# Patient Record
Sex: MALE | Race: WHITE | NOT HISPANIC OR LATINO | Employment: UNEMPLOYED | URBAN - NONMETROPOLITAN AREA
[De-identification: names, ages, dates, MRNs, and addresses within clinical notes are randomized per-mention and may not be internally consistent; named-entity substitution may affect disease eponyms.]

---

## 2021-11-19 ENCOUNTER — HOSPITAL ENCOUNTER (EMERGENCY)
Facility: HOSPITAL | Age: 46
Discharge: PSYCHIATRIC OR SUBSTANCE USE FACILITY | End: 2021-11-19
Attending: STUDENT IN AN ORGANIZED HEALTH CARE EDUCATION/TRAINING PROGRAM | Admitting: STUDENT IN AN ORGANIZED HEALTH CARE EDUCATION/TRAINING PROGRAM

## 2021-11-19 ENCOUNTER — HOSPITAL ENCOUNTER (INPATIENT)
Facility: HOSPITAL | Age: 46
LOS: 4 days | Discharge: LEFT BEFORE DISCHARGE | End: 2021-11-23
Attending: PSYCHIATRY & NEUROLOGY | Admitting: PSYCHIATRY & NEUROLOGY

## 2021-11-19 VITALS
TEMPERATURE: 97.9 F | WEIGHT: 190 LBS | OXYGEN SATURATION: 99 % | BODY MASS INDEX: 26.6 KG/M2 | SYSTOLIC BLOOD PRESSURE: 151 MMHG | HEIGHT: 71 IN | RESPIRATION RATE: 18 BRPM | DIASTOLIC BLOOD PRESSURE: 95 MMHG | HEART RATE: 67 BPM

## 2021-11-19 DIAGNOSIS — F19.10 SUBSTANCE ABUSE (HCC): Primary | ICD-10-CM

## 2021-11-19 DIAGNOSIS — R44.3 HALLUCINATIONS: ICD-10-CM

## 2021-11-19 PROBLEM — F19.20 CHEMICAL DEPENDENCY (HCC): Status: ACTIVE | Noted: 2021-11-19

## 2021-11-19 LAB
ALBUMIN SERPL-MCNC: 4.23 G/DL (ref 3.5–5.2)
ALBUMIN/GLOB SERPL: 1.2 G/DL
ALP SERPL-CCNC: 119 U/L (ref 39–117)
ALT SERPL W P-5'-P-CCNC: 124 U/L (ref 1–41)
AMPHET+METHAMPHET UR QL: POSITIVE
AMPHETAMINES UR QL: POSITIVE
ANION GAP SERPL CALCULATED.3IONS-SCNC: 11.8 MMOL/L (ref 5–15)
AST SERPL-CCNC: 119 U/L (ref 1–40)
BARBITURATES UR QL SCN: NEGATIVE
BASOPHILS # BLD AUTO: 0.03 10*3/MM3 (ref 0–0.2)
BASOPHILS NFR BLD AUTO: 0.4 % (ref 0–1.5)
BENZODIAZ UR QL SCN: NEGATIVE
BILIRUB SERPL-MCNC: 1.7 MG/DL (ref 0–1.2)
BILIRUB UR QL STRIP: NEGATIVE
BUN SERPL-MCNC: 9 MG/DL (ref 6–20)
BUN/CREAT SERPL: 11.3 (ref 7–25)
BUPRENORPHINE SERPL-MCNC: NEGATIVE NG/ML
CALCIUM SPEC-SCNC: 8.9 MG/DL (ref 8.6–10.5)
CANNABINOIDS SERPL QL: POSITIVE
CHLORIDE SERPL-SCNC: 102 MMOL/L (ref 98–107)
CLARITY UR: CLEAR
CO2 SERPL-SCNC: 24.2 MMOL/L (ref 22–29)
COCAINE UR QL: NEGATIVE
COLOR UR: YELLOW
CREAT SERPL-MCNC: 0.8 MG/DL (ref 0.76–1.27)
DEPRECATED RDW RBC AUTO: 50.8 FL (ref 37–54)
EOSINOPHIL # BLD AUTO: 0.14 10*3/MM3 (ref 0–0.4)
EOSINOPHIL NFR BLD AUTO: 1.9 % (ref 0.3–6.2)
ERYTHROCYTE [DISTWIDTH] IN BLOOD BY AUTOMATED COUNT: 13.3 % (ref 12.3–15.4)
ETHANOL BLD-MCNC: <10 MG/DL (ref 0–10)
ETHANOL UR QL: <0.01 %
FLUAV SUBTYP SPEC NAA+PROBE: NOT DETECTED
FLUBV RNA ISLT QL NAA+PROBE: NOT DETECTED
GFR SERPL CREATININE-BSD FRML MDRD: 105 ML/MIN/1.73
GLOBULIN UR ELPH-MCNC: 3.5 GM/DL
GLUCOSE SERPL-MCNC: 99 MG/DL (ref 65–99)
GLUCOSE UR STRIP-MCNC: NEGATIVE MG/DL
HCT VFR BLD AUTO: 43.9 % (ref 37.5–51)
HGB BLD-MCNC: 15.1 G/DL (ref 13–17.7)
HGB UR QL STRIP.AUTO: NEGATIVE
IMM GRANULOCYTES # BLD AUTO: 0.02 10*3/MM3 (ref 0–0.05)
IMM GRANULOCYTES NFR BLD AUTO: 0.3 % (ref 0–0.5)
KETONES UR QL STRIP: NEGATIVE
LEUKOCYTE ESTERASE UR QL STRIP.AUTO: NEGATIVE
LYMPHOCYTES # BLD AUTO: 1.49 10*3/MM3 (ref 0.7–3.1)
LYMPHOCYTES NFR BLD AUTO: 19.7 % (ref 19.6–45.3)
MAGNESIUM SERPL-MCNC: 1.7 MG/DL (ref 1.6–2.6)
MCH RBC QN AUTO: 35 PG (ref 26.6–33)
MCHC RBC AUTO-ENTMCNC: 34.4 G/DL (ref 31.5–35.7)
MCV RBC AUTO: 101.6 FL (ref 79–97)
METHADONE UR QL SCN: NEGATIVE
MONOCYTES # BLD AUTO: 0.71 10*3/MM3 (ref 0.1–0.9)
MONOCYTES NFR BLD AUTO: 9.4 % (ref 5–12)
NEUTROPHILS NFR BLD AUTO: 5.17 10*3/MM3 (ref 1.7–7)
NEUTROPHILS NFR BLD AUTO: 68.3 % (ref 42.7–76)
NITRITE UR QL STRIP: NEGATIVE
NRBC BLD AUTO-RTO: 0 /100 WBC (ref 0–0.2)
OPIATES UR QL: NEGATIVE
OXYCODONE UR QL SCN: NEGATIVE
PCP UR QL SCN: NEGATIVE
PH UR STRIP.AUTO: 6.5 [PH] (ref 5–8)
PLATELET # BLD AUTO: 80 10*3/MM3 (ref 140–450)
PMV BLD AUTO: 10.7 FL (ref 6–12)
POTASSIUM SERPL-SCNC: 3.2 MMOL/L (ref 3.5–5.2)
PROPOXYPH UR QL: NEGATIVE
PROT SERPL-MCNC: 7.7 G/DL (ref 6–8.5)
PROT UR QL STRIP: NEGATIVE
RBC # BLD AUTO: 4.32 10*6/MM3 (ref 4.14–5.8)
SARS-COV-2 RNA PNL SPEC NAA+PROBE: NOT DETECTED
SODIUM SERPL-SCNC: 138 MMOL/L (ref 136–145)
SP GR UR STRIP: 1.02 (ref 1–1.03)
TRICYCLICS UR QL SCN: NEGATIVE
UROBILINOGEN UR QL STRIP: NORMAL
WBC NRBC COR # BLD: 7.56 10*3/MM3 (ref 3.4–10.8)

## 2021-11-19 PROCEDURE — 85025 COMPLETE CBC W/AUTO DIFF WBC: CPT | Performed by: STUDENT IN AN ORGANIZED HEALTH CARE EDUCATION/TRAINING PROGRAM

## 2021-11-19 PROCEDURE — 83735 ASSAY OF MAGNESIUM: CPT | Performed by: STUDENT IN AN ORGANIZED HEALTH CARE EDUCATION/TRAINING PROGRAM

## 2021-11-19 PROCEDURE — 81003 URINALYSIS AUTO W/O SCOPE: CPT | Performed by: STUDENT IN AN ORGANIZED HEALTH CARE EDUCATION/TRAINING PROGRAM

## 2021-11-19 PROCEDURE — 80053 COMPREHEN METABOLIC PANEL: CPT | Performed by: STUDENT IN AN ORGANIZED HEALTH CARE EDUCATION/TRAINING PROGRAM

## 2021-11-19 PROCEDURE — 99284 EMERGENCY DEPT VISIT MOD MDM: CPT

## 2021-11-19 PROCEDURE — 93005 ELECTROCARDIOGRAM TRACING: CPT | Performed by: PSYCHIATRY & NEUROLOGY

## 2021-11-19 PROCEDURE — 80306 DRUG TEST PRSMV INSTRMNT: CPT | Performed by: STUDENT IN AN ORGANIZED HEALTH CARE EDUCATION/TRAINING PROGRAM

## 2021-11-19 PROCEDURE — 82077 ASSAY SPEC XCP UR&BREATH IA: CPT | Performed by: STUDENT IN AN ORGANIZED HEALTH CARE EDUCATION/TRAINING PROGRAM

## 2021-11-19 PROCEDURE — 87636 SARSCOV2 & INF A&B AMP PRB: CPT | Performed by: STUDENT IN AN ORGANIZED HEALTH CARE EDUCATION/TRAINING PROGRAM

## 2021-11-19 RX ORDER — LORAZEPAM 2 MG/1
2 TABLET ORAL
Status: DISPENSED | OUTPATIENT
Start: 2021-11-19 | End: 2021-11-20

## 2021-11-19 RX ORDER — LORAZEPAM 1 MG/1
1 TABLET ORAL EVERY 4 HOURS PRN
Status: DISPENSED | OUTPATIENT
Start: 2021-11-22 | End: 2021-11-23

## 2021-11-19 RX ORDER — FAMOTIDINE 20 MG/1
20 TABLET, FILM COATED ORAL 2 TIMES DAILY PRN
Status: DISCONTINUED | OUTPATIENT
Start: 2021-11-19 | End: 2021-11-23 | Stop reason: HOSPADM

## 2021-11-19 RX ORDER — HYDROXYZINE 50 MG/1
50 TABLET, FILM COATED ORAL EVERY 6 HOURS PRN
Status: DISCONTINUED | OUTPATIENT
Start: 2021-11-19 | End: 2021-11-23 | Stop reason: HOSPADM

## 2021-11-19 RX ORDER — LOPERAMIDE HYDROCHLORIDE 2 MG/1
2 CAPSULE ORAL
Status: DISCONTINUED | OUTPATIENT
Start: 2021-11-19 | End: 2021-11-23 | Stop reason: HOSPADM

## 2021-11-19 RX ORDER — ALUMINA, MAGNESIA, AND SIMETHICONE 2400; 2400; 240 MG/30ML; MG/30ML; MG/30ML
15 SUSPENSION ORAL EVERY 6 HOURS PRN
Status: DISCONTINUED | OUTPATIENT
Start: 2021-11-19 | End: 2021-11-23 | Stop reason: HOSPADM

## 2021-11-19 RX ORDER — IBUPROFEN 400 MG/1
400 TABLET ORAL EVERY 6 HOURS PRN
Status: DISCONTINUED | OUTPATIENT
Start: 2021-11-19 | End: 2021-11-23 | Stop reason: HOSPADM

## 2021-11-19 RX ORDER — TRAZODONE HYDROCHLORIDE 50 MG/1
50 TABLET ORAL NIGHTLY PRN
Status: DISCONTINUED | OUTPATIENT
Start: 2021-11-19 | End: 2021-11-22

## 2021-11-19 RX ORDER — LORAZEPAM 0.5 MG/1
0.5 TABLET ORAL EVERY 4 HOURS PRN
Status: DISCONTINUED | OUTPATIENT
Start: 2021-11-23 | End: 2021-11-23 | Stop reason: HOSPADM

## 2021-11-19 RX ORDER — LORAZEPAM 2 MG/1
2 TABLET ORAL
Status: DISPENSED | OUTPATIENT
Start: 2021-11-20 | End: 2021-11-21

## 2021-11-19 RX ORDER — LORAZEPAM 2 MG/1
2 TABLET ORAL EVERY 4 HOURS PRN
Status: DISPENSED | OUTPATIENT
Start: 2021-11-20 | End: 2021-11-21

## 2021-11-19 RX ORDER — BENZTROPINE MESYLATE 1 MG/ML
1 INJECTION INTRAMUSCULAR; INTRAVENOUS ONCE AS NEEDED
Status: DISCONTINUED | OUTPATIENT
Start: 2021-11-19 | End: 2021-11-23 | Stop reason: HOSPADM

## 2021-11-19 RX ORDER — BENZTROPINE MESYLATE 1 MG/1
2 TABLET ORAL ONCE AS NEEDED
Status: DISCONTINUED | OUTPATIENT
Start: 2021-11-19 | End: 2021-11-23 | Stop reason: HOSPADM

## 2021-11-19 RX ORDER — ECHINACEA PURPUREA EXTRACT 125 MG
2 TABLET ORAL AS NEEDED
Status: DISCONTINUED | OUTPATIENT
Start: 2021-11-19 | End: 2021-11-23 | Stop reason: HOSPADM

## 2021-11-19 RX ORDER — LORAZEPAM 1 MG/1
1 TABLET ORAL
Status: COMPLETED | OUTPATIENT
Start: 2021-11-22 | End: 2021-11-22

## 2021-11-19 RX ORDER — ONDANSETRON 4 MG/1
4 TABLET, FILM COATED ORAL EVERY 6 HOURS PRN
Status: DISCONTINUED | OUTPATIENT
Start: 2021-11-19 | End: 2021-11-23 | Stop reason: HOSPADM

## 2021-11-19 RX ORDER — BENZONATATE 100 MG/1
100 CAPSULE ORAL 3 TIMES DAILY PRN
Status: DISCONTINUED | OUTPATIENT
Start: 2021-11-19 | End: 2021-11-23 | Stop reason: HOSPADM

## 2021-11-19 RX ORDER — LORAZEPAM 0.5 MG/1
0.5 TABLET ORAL
Status: DISCONTINUED | OUTPATIENT
Start: 2021-11-23 | End: 2021-11-23 | Stop reason: HOSPADM

## 2021-11-19 RX ORDER — NICOTINE 21 MG/24HR
1 PATCH, TRANSDERMAL 24 HOURS TRANSDERMAL
Status: DISCONTINUED | OUTPATIENT
Start: 2021-11-20 | End: 2021-11-23 | Stop reason: HOSPADM

## 2021-11-19 RX ADMIN — HYDROXYZINE HYDROCHLORIDE 50 MG: 50 TABLET ORAL at 22:40

## 2021-11-19 RX ADMIN — TRAZODONE HYDROCHLORIDE 50 MG: 50 TABLET ORAL at 22:39

## 2021-11-19 RX ADMIN — LORAZEPAM 2 MG: 2 TABLET ORAL at 22:40

## 2021-11-20 PROCEDURE — 99223 1ST HOSP IP/OBS HIGH 75: CPT | Performed by: PSYCHIATRY & NEUROLOGY

## 2021-11-20 RX ORDER — AMOXICILLIN AND CLAVULANATE POTASSIUM 875; 125 MG/1; MG/1
1 TABLET, FILM COATED ORAL EVERY 12 HOURS SCHEDULED
Status: DISCONTINUED | OUTPATIENT
Start: 2021-11-20 | End: 2021-11-23 | Stop reason: HOSPADM

## 2021-11-20 RX ADMIN — LORAZEPAM 2 MG: 2 TABLET ORAL at 04:13

## 2021-11-20 RX ADMIN — LORAZEPAM 2 MG: 2 TABLET ORAL at 12:10

## 2021-11-20 RX ADMIN — LORAZEPAM 2 MG: 2 TABLET ORAL at 09:04

## 2021-11-20 RX ADMIN — Medication 100 MG: at 09:04

## 2021-11-20 RX ADMIN — LORAZEPAM 2 MG: 2 TABLET ORAL at 17:35

## 2021-11-20 RX ADMIN — LORAZEPAM 2 MG: 2 TABLET ORAL at 06:38

## 2021-11-20 RX ADMIN — AMOXICILLIN AND CLAVULANATE POTASSIUM 1 TABLET: 875; 125 TABLET, FILM COATED ORAL at 13:47

## 2021-11-20 NOTE — NURSING NOTE
[facility] wishes to be contacted when pt is up for discharge. Pt was brought here to be cleared for admission to [facility].    [phone]

## 2021-11-20 NOTE — H&P
INITIAL PSYCHIATRIC HISTORY & PHYSICAL    Patient Identification:  Name:   Zach Jorgensen  Age:   46 y.o.  Sex:   male  :   1975  MRN:   [id number]  Visit Number:   [id number]  Primary Care Physician:   Princess Costello MD    SUBJECTIVE    CC/Focus of Exam: chemical dependency    HPI: Zach Jorgensen is a 46 y.o. male who was admitted on 2021 with complaints of drug use and withdrawals. The patient reports a long history of substance use. First use was 14 years old. Over time the use increased and the patient  continued to use despite negative consequences. The patient endorses symptoms of tolerance and withdrawals. Has tried to cut down and stop but has not been successful. Spends too much time and resources in pursuit of substance use. Longest period of sobriety is reported to be 6 months.  Currently using opiates, xanax, meth, marijuana, fifth of whiskey  Last use 2021  Withdrawal symptoms nausea, diarrhea, anxious, antzy, body aches  Patient states that he uses tobacco. Patient states that he has a history of seizures with withdrawal. Patient states that he relapsed because he just wanted to get high. Patient states keeping up with the upkeep of his house as a stressor in his life. Patient denies any history of physical,mental or sexual abuse. Patient rates his appetite as poor. Patient rates his sleep as poor. Patient states that he has nightmares. Patient rates his anxiety on a scale of 1/10 with 10 being the most severe a 10. Patient rates his depression on a scale of 1/10 with 10 being the most severe a 10. Patient rates his cravings on a scale of 1/10 with 10 being the most severe a 10. Patient's COWS was 3. Patient's CIWA was 9. Patient denies any suicidal ideation. Patient denies any homicidal ideation. Patient states that he has hallucinations. Patient states that he hears his sister's voice telling him to get help, he hears a voice that he doesn't recognize telling him to kill himself  and he see's shadows sometimes. Patient was admitted to [facility] psychiatry for further safety and stabilization.    Available medical/psychiatric records reviewed and incorporated into the current document.     PAST PSYCHIATRIC HX: Patient has had no prior admissions. Patient denies any outpatient care. Patient states that he plans on returning to [facility] once discharged.    SUBSTANCE USE HX: UDS was positive for Methamphetamine, Amphetamine, THC. See HPI for current use.    SOCIAL HX: Patient states that he was born and raised in [place], [place]. Patient states that he currently resides with his room mate in [place], [place]. Patient states that he is single and has 2 children that lives with their mother. 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 Patient states that he has a highschool diploma. Patient denies any legal issues.    Past Medical History:   Diagnosis Date   • Alcohol abuse    • Anxiety    • Depression    • Hepatic disease    • Hypertension    • Substance abuse (HCC)    • Withdrawal symptoms, alcohol (HCC)        Past Surgical History:   Procedure Laterality Date   • CHOLECYSTECTOMY     • HERNIA REPAIR     • INCISION AND DRAINAGE ABSCESS      Rt forearm       Family History   Problem Relation Age of Onset   • Hypertension Mother    • Hypertension Father    • Heart disease Father    • Diabetes Father    • Aneurysm Father    • Diabetes Maternal Grandfather    • Heart disease Paternal Grandfather          No medications prior to admission.           ALLERGIES:  Patient has no known allergies.    Temp:  [97.3 °F (36.3 °C)-97.9 °F (36.6 °C)] 97.7 °F (36.5 °C)  Heart Rate:  [64-78] 78  Resp:  [16-18] 18  BP: (133-176)/() 143/89    REVIEW OF SYSTEMS:  Review of Systems   Constitutional: Positive for activity change, chills, diaphoresis and fatigue.   HENT: Negative for sneezing and sore throat.    Eyes: Negative for discharge and  visual disturbance.   Respiratory: Negative for apnea and shortness of breath.    Cardiovascular: Negative for chest pain and palpitations.   Gastrointestinal: Positive for diarrhea and nausea.   Endocrine: Negative for cold intolerance and heat intolerance.   Genitourinary: Negative for dysuria and frequency.   Musculoskeletal: Positive for arthralgias and myalgias.   Skin: Negative for pallor and rash.   Allergic/Immunologic: Negative for environmental allergies and food allergies.   Neurological: Positive for tremors and weakness. Negative for dizziness.   Hematological: Negative for adenopathy. Does not bruise/bleed easily.   Psychiatric/Behavioral: Positive for dysphoric mood and sleep disturbance. The patient is nervous/anxious.       See HPI for psychiatric ROS  OBJECTIVE    PHYSICAL EXAM:  Physical Exam  Constitutional:       Appearance: Normal appearance. He is normal weight.   HENT:      Head: Normocephalic and atraumatic.      Right Ear: External ear normal.      Left Ear: External ear normal.      Nose: Nose normal.      Mouth/Throat:      Mouth: Mucous membranes are moist.      Pharynx: Oropharynx is clear.   Eyes:      Extraocular Movements: Extraocular movements intact.      Conjunctiva/sclera: Conjunctivae normal.      Pupils: Pupils are equal, round, and reactive to light.   Cardiovascular:      Rate and Rhythm: Normal rate and regular rhythm.      Pulses: Normal pulses.      Heart sounds: Normal heart sounds.   Pulmonary:      Effort: Pulmonary effort is normal.      Breath sounds: Normal breath sounds.   Abdominal:      General: Abdomen is flat. Bowel sounds are normal.      Palpations: Abdomen is soft.   Musculoskeletal:         General: Normal range of motion.      Cervical back: Normal range of motion and neck supple.   Skin:     General: Skin is warm and dry.   Neurological:      General: No focal deficit present.      Mental Status: He is alert and oriented to person, place, and time. Mental  status is at baseline.      Comments: Somnolent         MENTAL STATUS EXAM:               Hygiene:   fair  Cooperation:  Cooperative  Eye Contact:  Poor  Psychomotor Behavior:  Appropriate  Affect:  Appropriate  Hopelessness: 3  Speech:  Minimal  Linear  Thought Content:  Normal  Suicidal:  None  Homicidal:  None  Hallucinations:  Auditory and Visual  Delusion:  None  Memory:  Intact  Orientation:  Person, Place, Time and Situation  Reliability:  fair  Insight:  Fair  Judgement:  Poor  Impulse Control:  Poor      Imaging Results (Last 24 Hours)     ** No results found for the last 24 hours. **           ECG/EMG Results (most recent)     Procedure Component Value Units Date/Time    ECG 12 Lead [[id number]] Collected: 11/19/21 2247     Updated: 11/19/21 2248     QT Interval 430 ms      QTC Interval 432 ms     Narrative:      Test Reason : Baseline Cardiac Status  Blood Pressure :   */*   mmHG  Vent. Rate :  61 BPM     Atrial Rate :  61 BPM     P-R Int : 144 ms          QRS Dur : 104 ms      QT Int : 430 ms       P-R-T Axes :  53  31  53 degrees     QTc Int : 432 ms    Sinus rhythm with premature atrial complexes  Incomplete right bundle branch block  Borderline ECG  No previous ECGs available    Referred By:            Confirmed By:            Lab Results   Component Value Date    GLUCOSE 99 11/19/2021    BUN 9 11/19/2021    CREATININE 0.80 11/19/2021    EGFRIFNONA 105 11/19/2021    BCR 11.3 11/19/2021    CO2 24.2 11/19/2021    CALCIUM 8.9 11/19/2021    ALBUMIN 4.23 11/19/2021     (H) 11/19/2021     (H) 11/19/2021       Lab Results   Component Value Date    WBC 7.56 11/19/2021    HGB 15.1 11/19/2021    HCT 43.9 11/19/2021    .6 (H) 11/19/2021    PLT 80 (L) 11/19/2021       Pain Management Panel     Pain Management Panel Latest Ref Rng & Units 11/19/2021    AMPHETAMINES SCREEN, URINE Negative Positive(A)    BARBITURATES SCREEN Negative Negative    BENZODIAZEPINE SCREEN, URINE Negative Negative     BUPRENORPHINEUR Negative Negative    COCAINE SCREEN, URINE Negative Negative    METHADONE SCREEN, URINE Negative Negative    METHAMPHETAMINEUR Negative Positive(A)          Brief Urine Lab Results  (Last result in the past 365 days)      Color   Clarity   Blood   Leuk Est   Nitrite   Protein   CREAT   Urine HCG        11/19/21 1953 Yellow   Clear   Negative   Negative   Negative   Negative                 Reviewed labs and studies done with this admission.       ASSESSMENT & PLAN:      Alcohol use disorder, severe, dependence  Benzodiazepine use disorder, severe, dependence  -Admitted for crisis stabilization and voluntary detox  -UDS negative for benzodiazepines but patient reports he last drank 15 hours prior to admission  -Ativan detox protocol  -Supplement with thiamine and multivitamin  -Encourage cessation  -Encourage substance abuse treatment at discharge    Opioid use disorder, severe, dependence  -UDS negative, no major withdrawal symptoms noted  -Monitor for withdrawal  -Encourage cessation  -Encourage substance abuse treatment at discharge    Methamphetamine use disorder, severe, dependence  -Supportive care  -Encourage cessation  -Encourage substance abuse treatment at discharge    Cannabis use disorder, severe, dependence  -Supportive care  -Encourage cessation  -Encourage substance abuse treatment at discharge    Nicotine use disorder  -Encourage cessation    Oral infection with severely poor dentition  -Patient endorses pain and pressure on his face around his gumline where he has severely deteriorated teeth  -Start Augmentin 875 mg twice daily for 10 days  -Encouraged follow-up with dentistry    The patient has been admitted for safety and stabilization.  Patient will be monitored for suicidality daily and maintained on Special Precautions Level 4 (q30 min checks)Special  Precautions Level 4 (q30 min checks).  The patient will have individual and group therapy with a master's level therapist. A  master treatment plan will be developed and agreed upon by the patient and his/her treatment team.  The patient's estimated length of stay in the hospital is 5-7 days.         Written by Jade Barraza acting as scribe for Dr.Jonathan Bond signature on this note affirms that the note adequately documents the care provided.     Jade Barraza MA  11/20/21  11:34 AM EST

## 2021-11-20 NOTE — ED PROVIDER NOTES
Subjective   46-year-old male with past medical history of anxiety and depression presents to the ER due to concerns for possible hallucinations and the desire to have alcohol detox.  Patient denied suicidal homicidal ideations.  Denied chest pain or shortness of breath.  Denied cough or congestion.  Denied fever.  Vitals stable          Review of Systems   Psychiatric/Behavioral: Positive for hallucinations. The patient is nervous/anxious.    All other systems reviewed and are negative.      Past Medical History:   Diagnosis Date   • Anxiety    • Depression    • Hepatic disease    • Hypertension        No Known Allergies    Past Surgical History:   Procedure Laterality Date   • CHOLECYSTECTOMY     • HERNIA REPAIR     • INCISION AND DRAINAGE ABSCESS      Rt forearm       Family History   Problem Relation Age of Onset   • Hypertension Mother    • Hypertension Father    • Heart disease Father    • Diabetes Father    • Diabetes Maternal Grandfather    • Heart disease Paternal Grandfather        Social History     Socioeconomic History   • Marital status:    Tobacco Use   • Smoking status: Current Some Day Smoker     Packs/day: 1.00     Types: Cigarettes   • Smokeless tobacco: Former User   Vaping Use   • Vaping Use: Some days   • Substances: THC   Substance and Sexual Activity   • Alcohol use: Yes     Comment: 1/5 whisky daily   • Drug use: Yes   • Sexual activity: Yes     Partners: Female     Birth control/protection: None           Objective   Physical Exam  Constitutional:       General: He is not in acute distress.     Appearance: He is well-developed. He is not ill-appearing.   HENT:      Head: Normocephalic and atraumatic.   Eyes:      Extraocular Movements: Extraocular movements intact.      Pupils: Pupils are equal, round, and reactive to light.   Neck:      Vascular: No JVD.   Cardiovascular:      Rate and Rhythm: Normal rate and regular rhythm.      Heart sounds: Normal heart sounds. No murmur  heard.      Pulmonary:      Effort: No tachypnea, accessory muscle usage or respiratory distress.      Breath sounds: Normal breath sounds. No stridor. No decreased breath sounds, wheezing, rhonchi or rales.   Chest:      Chest wall: No deformity, tenderness or crepitus.   Abdominal:      General: Bowel sounds are normal.      Palpations: Abdomen is soft.      Tenderness: There is no abdominal tenderness. There is no guarding or rebound.   Musculoskeletal:         General: Normal range of motion.      Cervical back: Normal range of motion and neck supple.      Right lower leg: No tenderness. No edema.      Left lower leg: No tenderness. No edema.   Lymphadenopathy:      Cervical: No cervical adenopathy.   Skin:     General: Skin is warm and dry.      Coloration: Skin is not cyanotic.      Findings: No ecchymosis or erythema.   Neurological:      General: No focal deficit present.      Mental Status: He is alert and oriented to person, place, and time.      Cranial Nerves: No cranial nerve deficit.      Motor: No weakness.   Psychiatric:         Mood and Affect: Mood normal. Mood is not anxious.         Behavior: Behavior normal. Behavior is not agitated.         Procedures           ED Course  ED Course as of 11/19/21 2230 Fri Nov 19, 2021 2229 CBC and CCP unremarkable.  Ethanol within normal limits.  UDS positive for THC and amphetamines.  Urinalysis unremarkable.  Covid testing negative.  Evaluated by behavioral health.  Patient will be admitted to their detox program.  Vital stable on admission and patient agreeable [SF]      ED Course User Index  [SF] Clifford Villafana, DO                                           MDM    Final diagnoses:   Substance abuse (HCC)   Hallucinations       ED Disposition  ED Disposition     ED Disposition Condition Comment    Discharge to Behavioral Health Stable           No follow-up provider specified.       Medication List      No changes were made to your prescriptions during  this visit.          Clifford Villafana,   11/19/21 6416

## 2021-11-20 NOTE — PLAN OF CARE
Goal Outcome Evaluation:           Progress: no change  Outcome Summary: New admit this shift. Pt had elevated bp

## 2021-11-20 NOTE — NURSING NOTE
"Intake assessment completed at this time. Pt presents to Intake to be evaluated for [facility]. Pt has drunk ETOH last 15 hrs ago and used opiates, Xanax, meth, and marijuana in the last week. Pt states a couple times weekly, he uses unprescribed opiates for pain at work, uses meth at work, and Xanax and ETOH to help him sleep.    Labs  K+ 3.2      Platelets 80  UDS positive for methamphatamines, THC, and amphetamines.    Anxiety 10 depression 10 craving 10 on scale of 0-10. Sleep poor appetite poor.  Pt denies SI/HI, but states he has heard voices for the last couple weeks. Sometimes the voices are an inner monologue, sometimes his sister's voice telling him to get help, and occasionally a voice he does not recognize telling him to \"kill myself\". Pt is having no auditory hallucinations at this time.    COWS 3  CIWA 9    Pt A&Ox 3  "

## 2021-11-20 NOTE — NURSING NOTE
Patient was brought from home by [facility]. Patient states he has been drinking 1/5 of whiskey daily x 2 years. In the last two years he has had no days of sobriety. He reports past hx of drug problems that started at 12/16 years old. He reports trading drugs for alcohol. He is seeking treatment d/t concerns about his health, work problems, relationship problems and family is concerned about his drinking. Patient reports smoking MJ daily for the last 20 years, snorting meth 1-2 x per week. He reports hearing an inner dialog in his head telling him that he needs to get clean, sometimes in the voice of his sister or other people. He reports hearing people in the room (now) calling him a meth head. Anxiety, depression and craving rated 10/10. He reports that he is drinking to pass out, he has been having blackouts. He reports fatigue and poor sleep, he reports that he has not eaten today, he is hungry but is feeling nauseated. CIWA score 17 upon admission

## 2021-11-20 NOTE — PLAN OF CARE
Goal Outcome Evaluation:  Plan of Care Reviewed With: patient  Patient Agreement with Plan of Care: agrees     Progress: improving  Outcome Summary: Patient isolates in his room except for meals and medication. A&OX3. Rates anxiety 3/10. Cravings 5/10. Wd's nausea and sweats. Denies depression, Si, HI, or dunn. No other issues at this time. will conintue to monitor.

## 2021-11-20 NOTE — NURSING NOTE
Spoke to Dr. Bond via phone. Intake information and Lab results provided. Instructed to admit the patient. Admit orders received. SP4 routine orders with Ativan detox protocol RBVOx2. Patient and ED provider made aware of plan of care. Safety precautions maintained.

## 2021-11-20 NOTE — PLAN OF CARE
Problem: Adult Behavioral Health Plan of Care  Goal: Plan of Care Review  Outcome: Ongoing, Not Progressing  Flowsheets (Taken 11/20/2021 1516)  Progress: no change  Plan of Care Reviewed With: patient  Patient Agreement with Plan of Care: unable to participate  Outcome Summary: attempted to review plan of care, patient unable to awaken, information gathered from chart/history     Problem: Adult Behavioral Health Plan of Care  Goal: Patient-Specific Goal (Individualization)  Outcome: Ongoing, Not Progressing  Flowsheets  Taken 11/20/2021 1516 by Brittany Gonzalez LCSW  Patient-Specific Goals (Include Timeframe): Zach to complete medical detox prior to discharge, identify 1-2 healthy coping skills to assist with relapse prevention during his 3-7 day hospital stay, engage in safe disposition planning  Individualized Care Needs: medical detox, individual and group therapy  Taken 11/20/2021 1512 by Brittany Gonzalez LCSW  Patient Personal Strengths: resourceful  Patient Vulnerabilities: substance abuse/addiction  Taken 11/19/2021 2210 by Marilia Perez RN  Anxieties, Fears or Concerns: none reported     Problem: Adult Behavioral Health Plan of Care  Goal: Develops/Participates in Therapeutic [place] to Support Successful Transition  Intervention: Mutually Develop Transition Plan  Recent Flowsheet Documentation  Taken 11/20/2021 1510 by Brittany Gonzalez LCSW  Discharge Coordination/Progress: Patient has no insurance, staff from [facility] brought patient in and will likely pick him up upon stabilization.  Transportation Anticipated: agency  Transportation Concerns: car, none  Current Discharge Risk:   psychiatric illness   substance use/abuse  Concerns to be Addressed:   coping/stress   mental health   substance/tobacco abuse/use  Readmission Within the Last 30 Days: no previous admission in last 30 days  Patient/Family Anticipated Services at Transition: rehabilitation  services  Patient's Choice of Community Agency(s): [facility] staff brought patient in  Patient/Family Anticipates Transition to: inpatient rehabilitation facility  Offered/Gave Vendor List: no   Goal Outcome Evaluation:  Plan of Care Reviewed With: patient  Patient Agreement with Plan of Care: unable to participate     Progress: no change  Outcome Summary: attempted to review plan of care, patient unable to awaken, information gathered from chart/history    DATA:         Therapist attempted to meet individually with patient this date to introduce role and to discuss hospitalization expectations. Patient unable to participate.     Clinical Maneuvering/Intervention:     Therapist gathered information from chart/history as patient was unable to participate.      Therapist initiated integrated summary, treatment plan, and initiated social history this date.  Therapist is strongly encouraging family involvement in treatment.       ASSESSMENT:      The patient is a 46 year old , employed male residing in [facility] with his girlfriend.  He presents for medical detox from daily alcohol, THC and weekly methamphetamine use.  Patient reports drinking 1/5 of whiskey daily, smoking marijuana daily and using methamphetamine 1-2 times weekly. Patient rates his appetite as poor. Patient rates his sleep as poor. Patient states that he has nightmares. Patient rates his anxiety on a scale of 1/10 with 10 being the most severe a 10. Patient rates his depression on a scale of 1/10 with 10 being the most severe a 10. Patient rates his cravings on a scale of 1/10 with 10 being the most severe a 10. Patient's COWS was 3. Patient's CIWA was 9. Patient endorses withdrawal symptoms of nausea, diarrhea, anxious, antzy, and body aches. Patient presented with staff from [facility] and according to the record plans to return there.  Patient is high school educated and is employed full time in construction.        PLAN:       Patient to remain hospitalized this date.     Treatment team will focus efforts on stabilizing patient's acute symptoms while providing education on healthy coping and crisis management to reduce hospitalizations.   Patient requires daily psychiatrist evaluation and 24/7 nursing supervision to promote patient  safety.     Therapist will offer 1-4 individual sessions, 1 therapy group daily, family education, and appropriate referral.    According to the record patient plans to return to [facility] following stabilization.

## 2021-11-20 NOTE — NURSING NOTE
Xanderium Lead RN contacted at this time for chart review and request of bed assignment, Bed assigned to 31B.

## 2021-11-21 PROCEDURE — 99233 SBSQ HOSP IP/OBS HIGH 50: CPT | Performed by: PSYCHIATRY & NEUROLOGY

## 2021-11-21 RX ADMIN — IBUPROFEN 400 MG: 400 TABLET, FILM COATED ORAL at 21:32

## 2021-11-21 RX ADMIN — AMOXICILLIN AND CLAVULANATE POTASSIUM 1 TABLET: 875; 125 TABLET, FILM COATED ORAL at 21:34

## 2021-11-21 RX ADMIN — LORAZEPAM 1.5 MG: 1 TABLET ORAL at 21:33

## 2021-11-21 RX ADMIN — TRAZODONE HYDROCHLORIDE 50 MG: 50 TABLET ORAL at 21:32

## 2021-11-21 RX ADMIN — LORAZEPAM 1.5 MG: 1 TABLET ORAL at 14:02

## 2021-11-21 RX ADMIN — HYDROXYZINE HYDROCHLORIDE 50 MG: 50 TABLET ORAL at 21:32

## 2021-11-21 RX ADMIN — AMOXICILLIN AND CLAVULANATE POTASSIUM 1 TABLET: 875; 125 TABLET, FILM COATED ORAL at 08:38

## 2021-11-21 RX ADMIN — Medication 100 MG: at 08:38

## 2021-11-21 RX ADMIN — LORAZEPAM 1.5 MG: 1 TABLET ORAL at 08:37

## 2021-11-21 NOTE — PLAN OF CARE
Goal Outcome Evaluation:  Plan of Care Reviewed With: patient  Patient Agreement with Plan of Care: agrees        Outcome Summary: pt calm and cooperative with staff and other pts. Pt continues to isolate to room. No acute distress noted this shift. Alert and Oreint x3.  More talkative with staff this shift Pt denies any SI/HI/AVH.  Pt has no complaints this shift.  Pt educated on alerting staff if awake during rounding.  Pt has been educated on Covid-19 teaching includes washing hands, not touching face and social distancing.

## 2021-11-21 NOTE — NURSING NOTE
Lucille Benjamin [phone] from [facility] called requesting estimated Discharge date.    Advised no date at this time.  Will have therapist call when date scheduled

## 2021-11-21 NOTE — PROGRESS NOTES
Subjective   Zach Jorgensen is a 46 y.o. male who presents today for hospital follow up    Chief Complaint: Polysubstance abuse    History of Present Illness: Patient reports that he is doing somewhat better today. He still has some nausea, muscle aches, anxiety, shaking, and decreased appetite but is stable otherwise. He reports no major mood or anxiety symptoms. He feels that his oral pain is improving. He denies SI/HI/AVH.    The following portions of the patient's history were reviewed and updated as appropriate: allergies, current medications, past family history, past medical history, past social history, past surgical history and problem list.      Past Medical History:  Past Medical History:   Diagnosis Date   • Alcohol abuse    • Anxiety    • Depression    • Hepatic disease    • Hypertension    • Substance abuse (HCC)    • Withdrawal symptoms, alcohol (HCC)        Social History:  Social History     Socioeconomic History   • Marital status:    Tobacco Use   • Smoking status: Current Some Day Smoker     Packs/day: 1.00     Years: 34.00     Pack years: 34.00     Types: Cigarettes   • Smokeless tobacco: Current User     Types: Snuff   • Tobacco comment: started smoking at 12 years old, dips one can q 2 weeks   Vaping Use   • Vaping Use: Some days   • Substances: THC   • Devices: Disposable   Substance and Sexual Activity   • Alcohol use: Yes     Comment: 1/5 whisky daily   • Drug use: Yes     Types: Methamphetamines, Marijuana   • Sexual activity: Yes     Partners: Female     Birth control/protection: None       Family History:  Family History   Problem Relation Age of Onset   • Hypertension Mother    • Hypertension Father    • Heart disease Father    • Diabetes Father    • Aneurysm Father    • Diabetes Maternal Grandfather    • Heart disease Paternal Grandfather        Past Surgical History:  Past Surgical History:   Procedure Laterality Date   • CHOLECYSTECTOMY     • HERNIA REPAIR     • INCISION AND  DRAINAGE ABSCESS      Rt forearm       Problem List:  Patient Active Problem List   Diagnosis   • Calculus of gallbladder without cholecystitis without obstruction   • Chronic hepatitis C virus infection (HCC)   • Chemical dependency (HCC)       Allergy:   No Known Allergies     Current Medications:   Current Facility-Administered Medications   Medication Dose Route Frequency Provider Last Rate Last Admin   • aluminum-magnesium hydroxide-simethicone (MAALOX MAX) 400-400-40 MG/5ML suspension 15 mL  15 mL Oral Q6H PRN Gio Bond MD       • amoxicillin-clavulanate (AUGMENTIN) 875-125 MG per tablet 1 tablet  1 tablet Oral Q12H Gio Bond MD   1 tablet at 11/21/21 0838   • benzonatate (TESSALON) capsule 100 mg  100 mg Oral TID PRN Gio Bond MD       • benztropine (COGENTIN) tablet 2 mg  2 mg Oral Once PRN Gio Bond MD        Or   • benztropine (COGENTIN) injection 1 mg  1 mg Intramuscular Once PRN Gio Bond MD       • famotidine (PEPCID) tablet 20 mg  20 mg Oral BID PRN Gio Bond MD       • hydrOXYzine (ATARAX) tablet 50 mg  50 mg Oral Q6H PRN Gio Bond MD   50 mg at 11/19/21 2240   • ibuprofen (ADVIL,MOTRIN) tablet 400 mg  400 mg Oral Q6H PRN Gio Bond MD       • loperamide (IMODIUM) capsule 2 mg  2 mg Oral Q2H PRN Gio Bond MD       • LORazepam (ATIVAN) tablet 1.5 mg  1.5 mg Oral 3 times per day Gio Bond MD   1.5 mg at 11/21/21 0837    Followed by   • [START ON 11/22/2021] LORazepam (ATIVAN) tablet 1 mg  1 mg Oral 3 times per day Gio Bond MD        Followed by   • [START ON 11/23/2021] LORazepam (ATIVAN) tablet 0.5 mg  0.5 mg Oral 3 times per day Gio Bond MD       • LORazepam (ATIVAN) tablet 1.5 mg  1.5 mg Oral Q4H PRN Gio Bond MD        Followed by   • [START ON 11/22/2021] LORazepam (ATIVAN) tablet 1 mg  1 mg Oral Q4H PRN Gio Bond MD        Followed by   • [START ON 11/23/2021] LORazepam (ATIVAN) tablet  "0.5 mg  0.5 mg Oral Q4H PRN Gio Bond MD       • magnesium hydroxide (MILK OF MAGNESIA) suspension 10 mL  10 mL Oral Daily PRN Gio Bond MD       • nicotine (NICODERM CQ) 21 MG/24HR patch 1 patch  1 patch Transdermal Q24H Gio Bond MD       • ondansetron (ZOFRAN) tablet 4 mg  4 mg Oral Q6H PRN Gio Bond MD       • sodium chloride nasal spray 2 spray  2 spray Each Nare PRN Gio Bond MD       • thiamine (VITAMIN B-1) tablet 100 mg  100 mg Oral Daily Gio Bond MD   100 mg at 11/21/21 0838   • traZODone (DESYREL) tablet 50 mg  50 mg Oral Nightly PRN Gio Bond MD   50 mg at 11/19/21 2239       Review of Symptoms:    Review of Systems   Constitutional: Positive for activity change, appetite change, chills, diaphoresis and fatigue.   Respiratory: Negative.    Cardiovascular: Negative.    Gastrointestinal: Positive for nausea. Negative for vomiting.   Genitourinary: Negative.    Musculoskeletal: Positive for back pain and myalgias.   Psychiatric/Behavioral: Positive for sleep disturbance. The patient is nervous/anxious.          Physical Exam:   Blood pressure 155/92, pulse 83, temperature 98.6 °F (37 °C), temperature source Temporal, resp. rate 18, height 180.3 cm (71\"), weight 78.8 kg (173 lb 12.8 oz), SpO2 97 %.    Appearance: Male of stated age, NAD   Gait, Station, Strength: WNL    Mental Status Exam:   Hygiene:   fair  Cooperation:  Cooperative  Eye Contact:  Good  Psychomotor Behavior:  Slow  Affect:  Full range  Mood: normal  Hopelessness: Denies  Speech:  Normal  Thought Process:  Goal directed and Linear  Thought Content:  Normal and Mood congruent  Suicidal:  None  Homicidal:  None  Hallucinations:  None  Delusion:  None  Memory:  Intact  Orientation:  Person, Place, Time and Situation  Reliability:  good  Insight:  Fair  Judgement:  Poor  Impulse Control:  Poor      Lab Results:   Admission on 11/19/2021   Component Date Value Ref Range Status   • QT " Interval 11/19/2021 430  ms Preliminary   • QTC Interval 11/19/2021 432  ms Preliminary   Admission on 11/19/2021, Discharged on 11/19/2021   Component Date Value Ref Range Status   • Glucose 11/19/2021 99  65 - 99 mg/dL Final   • BUN 11/19/2021 9  6 - 20 mg/dL Final   • Creatinine 11/19/2021 0.80  0.76 - 1.27 mg/dL Final   • Sodium 11/19/2021 138  136 - 145 mmol/L Final   • Potassium 11/19/2021 3.2* 3.5 - 5.2 mmol/L Final   • Chloride 11/19/2021 102  98 - 107 mmol/L Final   • CO2 11/19/2021 24.2  22.0 - 29.0 mmol/L Final   • Calcium 11/19/2021 8.9  8.6 - 10.5 mg/dL Final   • Total Protein 11/19/2021 7.7  6.0 - 8.5 g/dL Final   • Albumin 11/19/2021 4.23  3.50 - 5.20 g/dL Final   • ALT (SGPT) 11/19/2021 124* 1 - 41 U/L Final   • AST (SGOT) 11/19/2021 119* 1 - 40 U/L Final   • Alkaline Phosphatase 11/19/2021 119* 39 - 117 U/L Final   • Total Bilirubin 11/19/2021 1.7* 0.0 - 1.2 mg/dL Final   • eGFR Non African Amer 11/19/2021 105  >60 mL/min/1.73 Final   • Globulin 11/19/2021 3.5  gm/dL Final   • A/G Ratio 11/19/2021 1.2  g/dL Final   • BUN/Creatinine Ratio 11/19/2021 11.3  7.0 - 25.0 Final   • Anion Gap 11/19/2021 11.8  5.0 - 15.0 mmol/L Final   • Color, UA 11/19/2021 Yellow  Yellow, Straw Final   • Appearance, UA 11/19/2021 Clear  Clear Final   • pH, UA 11/19/2021 6.5  5.0 - 8.0 Final   • Specific Gravity, UA 11/19/2021 1.016  1.005 - 1.030 Final   • Glucose, UA 11/19/2021 Negative  Negative Final   • Ketones, UA 11/19/2021 Negative  Negative Final   • Bilirubin, UA 11/19/2021 Negative  Negative Final   • Blood, UA 11/19/2021 Negative  Negative Final   • Protein, UA 11/19/2021 Negative  Negative Final   • Leuk Esterase, UA 11/19/2021 Negative  Negative Final   • Nitrite, UA 11/19/2021 Negative  Negative Final   • Urobilinogen, UA 11/19/2021 1.0 E.U./dL  0.2 - 1.0 E.U./dL Final   • THC, Screen, Urine 11/19/2021 Positive* Negative Final   • Phencyclidine (PCP), Urine 11/19/2021 Negative  Negative Final   • Cocaine  Screen, Urine 11/19/2021 Negative  Negative Final   • Methamphetamine, Ur 11/19/2021 Positive* Negative Final   • Opiate Screen 11/19/2021 Negative  Negative Final   • Amphetamine Screen, Urine 11/19/2021 Positive* Negative Final   • Benzodiazepine Screen, Urine 11/19/2021 Negative  Negative Final   • Tricyclic Antidepressants Screen 11/19/2021 Negative  Negative Final   • Methadone Screen, Urine 11/19/2021 Negative  Negative Final   • Barbiturates Screen, Urine 11/19/2021 Negative  Negative Final   • Oxycodone Screen, Urine 11/19/2021 Negative  Negative Final   • Propoxyphene Screen 11/19/2021 Negative  Negative Final   • Buprenorphine, Screen, Urine 11/19/2021 Negative  Negative Final   • Ethanol 11/19/2021 <10  0 - 10 mg/dL Final   • Ethanol % 11/19/2021 <0.010  % Final   • COVID19 11/19/2021 Not Detected  Not Detected - Ref. Range Final   • Influenza A PCR 11/19/2021 Not Detected  Not Detected Final   • Influenza B PCR 11/19/2021 Not Detected  Not Detected Final   • WBC 11/19/2021 7.56  3.40 - 10.80 10*3/mm3 Final   • RBC 11/19/2021 4.32  4.14 - 5.80 10*6/mm3 Final   • Hemoglobin 11/19/2021 15.1  13.0 - 17.7 g/dL Final   • Hematocrit 11/19/2021 43.9  37.5 - 51.0 % Final   • MCV 11/19/2021 101.6* 79.0 - 97.0 fL Final   • MCH 11/19/2021 35.0* 26.6 - 33.0 pg Final   • MCHC 11/19/2021 34.4  31.5 - 35.7 g/dL Final   • RDW 11/19/2021 13.3  12.3 - 15.4 % Final   • RDW-SD 11/19/2021 50.8  37.0 - 54.0 fl Final   • MPV 11/19/2021 10.7  6.0 - 12.0 fL Final   • Platelets 11/19/2021 80* 140 - 450 10*3/mm3 Final   • Neutrophil % 11/19/2021 68.3  42.7 - 76.0 % Final   • Lymphocyte % 11/19/2021 19.7  19.6 - 45.3 % Final   • Monocyte % 11/19/2021 9.4  5.0 - 12.0 % Final   • Eosinophil % 11/19/2021 1.9  0.3 - 6.2 % Final   • Basophil % 11/19/2021 0.4  0.0 - 1.5 % Final   • Immature Grans % 11/19/2021 0.3  0.0 - 0.5 % Final   • Neutrophils, Absolute 11/19/2021 5.17  1.70 - 7.00 10*3/mm3 Final   • Lymphocytes, Absolute 11/19/2021  1.49  0.70 - 3.10 10*3/mm3 Final   • Monocytes, Absolute 11/19/2021 0.71  0.10 - 0.90 10*3/mm3 Final   • Eosinophils, Absolute 11/19/2021 0.14  0.00 - 0.40 10*3/mm3 Final   • Basophils, Absolute 11/19/2021 0.03  0.00 - 0.20 10*3/mm3 Final   • Immature Grans, Absolute 11/19/2021 0.02  0.00 - 0.05 10*3/mm3 Final   • nRBC 11/19/2021 0.0  0.0 - 0.2 /100 WBC Final   • Magnesium 11/19/2021 1.7  1.6 - 2.6 mg/dL Final       Assessment/Plan     Alcohol use disorder, severe, dependence  -Admitted for crisis stabilization and voluntary detox  -UDS negative for benzodiazepines but patient reports he last drank 15 hours prior to admission  -Ativan detox protocol  -Supplement with thiamine and multivitamin  -Encourage cessation  -Encourage substance abuse treatment at discharge    Benzodiazepine use disorder, severe, dependence   -UDS negative for benzodiazepines but patient reports he last drank 15 hours prior to admission  -Ativan detox protocol  -Supplement with thiamine and multivitamin  -Encourage cessation  -Encourage substance abuse treatment at discharge    Opioid use disorder, severe, dependence  -UDS negative, no major withdrawal symptoms noted  -Monitor for withdrawal  -Encourage cessation  -Encourage substance abuse treatment at discharge     Methamphetamine use disorder, severe, dependence  -Supportive care  -Encourage cessation  -Encourage substance abuse treatment at discharge     Cannabis use disorder, severe, dependence  -Supportive care  -Encourage cessation  -Encourage substance abuse treatment at discharge     Nicotine use disorder  -Encourage cessation     Oral infection with severely poor dentition  -Patient endorses pain and pressure on his face around his gumline where he has severely deteriorated teeth  -Started Augmentin 875 mg twice daily for 10 days on 11/20/21  -Encouraged follow-up with dentistry     The patient has been admitted for safety and stabilization.  Patient will be monitored for  suicidality daily and maintained on Special Precautions Level 4 (q30 min checks)Special  Precautions Level 4 (q30 min checks).  The patient will have individual and group therapy with a master's level therapist. A master treatment plan will be developed and agreed upon by the patient and his/her treatment team.  The patient's estimated length of stay in the hospital is 5-7 days.       This document has been electronically signed by Gio Bond MD  November 21, 2021 12:31 EST    Dictated using Dragon Dictation.

## 2021-11-21 NOTE — PLAN OF CARE
Problem: Adult Behavioral Health Plan of Care  Goal: Plan of Care Review  Outcome: Ongoing, Progressing  Goal: Patient-Specific Goal (Individualization)  Outcome: Ongoing, Progressing  Goal: Adheres to Safety Considerations for Self and Others  Outcome: Ongoing, Progressing  Goal: Absence of New-Onset Illness or Injury  Outcome: Ongoing, Progressing  Goal: Optimized Coping Skills in Response to Life Stressors  Outcome: Ongoing, Progressing  Goal: Develops/Participates in Therapeutic [place] to Support Successful Transition  Outcome: Ongoing, Progressing     Problem: Activity and Energy Impairment (Excessive Substance Use)  Goal: Optimized Energy Level (Excessive Substance Use)  Outcome: Ongoing, Progressing  Intervention: Optimize Energy Level  Description: Encourage activities to promote self-care.  Provide structured exercise options.  Encourage participation in expressive and recreational services.  Utilize mindfulness, stress management and applied relaxation techniques.     Problem: Behavior Regulation Impairment (Excessive Substance Use)  Goal: Improved Behavioral Control (Excessive Substance Use)  Outcome: Ongoing, Progressing     Problem: Decreased Participation and Engagement (Excessive Substance Use)  Goal: Increased Participation and Engagement (Excessive Substance Use)  Outcome: Ongoing, Progressing     Problem: Physiologic Impairment (Excessive Substance Use)  Goal: Improved Physiologic Symptoms (Excessive Substance Use)  Outcome: Ongoing, Progressing     Problem: Social, Occupational or Functional Impairment (Excessive Substance Use)  Goal: Enhanced Social, Occupational or Functional Skills (Excessive Substance Use)  Outcome: Ongoing, Progressing     Problem: Alcohol Withdrawal  Goal: Alcohol Withdrawal Symptom Control  Outcome: Ongoing, Progressing     Problem: Activity and Energy Impairment (Depressive Signs/Symptoms)  Goal: Optimized Energy Level (Depressive Signs/Symptoms)  Outcome: Ongoing,  Progressing  Intervention: Optimize Energy Level  Description: Encourage activities to promote self-care.  Provide structured exercise options.  Encourage participation in behavioral-activation therapy  Encourage participation in expressive and recreational services.     Problem: Cognitive Impairment (Depressive Signs/Symptoms)  Goal: Optimized Cognitive Function (Depressive Signs/Symptoms)  Outcome: Ongoing, Progressing     Problem: Decreased Participation and Engagement (Depressive Signs/Symptoms)  Goal: Increased Participation and Engagement (Depressive Signs/Symptoms)  Outcome: Ongoing, Progressing     Problem: Feelings of Worthlessness, Hopelessness or Excessive Guilt (Depressive Signs/Symptoms)  Goal: Enhanced Self-Esteem and Confidence (Depressive Signs/Symptoms)  Outcome: Ongoing, Progressing     Problem: Mood Impairment (Depressive Signs/Symptoms)  Goal: Improved Mood Symptoms (Depressive Signs/Symptoms)  Outcome: Ongoing, Progressing     Problem: Nutrition Imbalance (Depressive Signs/Symptoms)  Goal: Optimized Nutrition Intake (Depressive Signs/Symptoms)  Outcome: Ongoing, Progressing  Intervention: Promote Optimal Nutrition Intake  Description: Perform a nutrition assessment that includes a nutrition-focused physical exam.  Evaluate need for diet modification, limitations or supplementation.  Monitor food and beverage selections; assess intake, eating patterns and behavior.  Weigh patient and monitor trends.  Encourage healthy food choices, such as fruits, vegetables, whole grains, low-fat dairy products and lean meats.  Consider cultural and Mosque preferences when providing food and beverage choices.     Problem: Psychomotor Impairment (Depressive Signs/Symptoms)  Goal: Improved Psychomotor Symptoms (Depressive Signs/Symptoms)  Outcome: Ongoing, Progressing  Intervention: Manage Psychomotor Movement  Description: Assess and monitor for agitation or retardation.  Support neurostimulation therapy;  provide postprocedure care.  Support medication adherence; monitor response and side effects.  Monitor laboratory values for therapeutic levels.     Problem: Sleep Disturbance (Depressive Signs/Symptoms)  Goal: Improved Sleep (Depressive Signs/Symptoms)  Outcome: Ongoing, Progressing     Problem: Social, Occupational or Functional Impairment (Depressive Signs/Symptoms)  Goal: Enhanced Social, Occupational or Functional Skills (Depressive Signs/Symptoms)  Outcome: Ongoing, Progressing     Problem: Electrolyte Imbalance  Goal: Electrolyte Balance  Outcome: Ongoing, Progressing   Goal Outcome Evaluation:     Patient Agreement with Plan of Care: unable to participate     Progress: no change  Outcome Summary: Pt sleeping. Very difficult to awaken. Would not participate in assessment.

## 2021-11-22 LAB
QT INTERVAL: 430 MS
QTC INTERVAL: 432 MS

## 2021-11-22 PROCEDURE — 99232 SBSQ HOSP IP/OBS MODERATE 35: CPT | Performed by: PSYCHIATRY & NEUROLOGY

## 2021-11-22 RX ORDER — METOPROLOL SUCCINATE 25 MG/1
25 TABLET, EXTENDED RELEASE ORAL
Status: DISCONTINUED | OUTPATIENT
Start: 2021-11-22 | End: 2021-11-23 | Stop reason: HOSPADM

## 2021-11-22 RX ORDER — DOXEPIN HYDROCHLORIDE 25 MG/1
25 CAPSULE ORAL NIGHTLY PRN
Status: DISCONTINUED | OUTPATIENT
Start: 2021-11-22 | End: 2021-11-23 | Stop reason: HOSPADM

## 2021-11-22 RX ADMIN — LORAZEPAM 1 MG: 1 TABLET ORAL at 14:46

## 2021-11-22 RX ADMIN — HYDROXYZINE HYDROCHLORIDE 50 MG: 50 TABLET ORAL at 21:22

## 2021-11-22 RX ADMIN — Medication 100 MG: at 08:05

## 2021-11-22 RX ADMIN — LORAZEPAM 1 MG: 1 TABLET ORAL at 08:05

## 2021-11-22 RX ADMIN — AMOXICILLIN AND CLAVULANATE POTASSIUM 1 TABLET: 875; 125 TABLET, FILM COATED ORAL at 08:05

## 2021-11-22 RX ADMIN — IBUPROFEN 400 MG: 400 TABLET, FILM COATED ORAL at 21:22

## 2021-11-22 RX ADMIN — AMOXICILLIN AND CLAVULANATE POTASSIUM 1 TABLET: 875; 125 TABLET, FILM COATED ORAL at 21:22

## 2021-11-22 RX ADMIN — LORAZEPAM 1 MG: 1 TABLET ORAL at 21:22

## 2021-11-22 RX ADMIN — ALUMINUM HYDROXIDE, MAGNESIUM HYDROXIDE, AND DIMETHICONE 15 ML: 400; 400; 40 SUSPENSION ORAL at 21:22

## 2021-11-22 RX ADMIN — LORAZEPAM 1 MG: 1 TABLET ORAL at 09:35

## 2021-11-22 RX ADMIN — METOPROLOL SUCCINATE 25 MG: 25 TABLET, EXTENDED RELEASE ORAL at 10:27

## 2021-11-22 NOTE — PLAN OF CARE
Problem: Adult Behavioral Health Plan of Care  Goal: Patient-Specific Goal (Individualization)  Outcome: Ongoing, Progressing  Flowsheets  Taken 11/20/2021 1516 by Brittany Gonzalez LCSW  Patient-Specific Goals (Include Timeframe): Zach to complete medical detox prior to discharge, identify 1-2 healthy coping skills to assist with relapse prevention during his 3-7 day hospital stay, engage in safe disposition planning  Individualized Care Needs: medical detox, individual and group therapy  Taken 11/20/2021 1512 by Brittany Gonzalez LCSW  Patient Personal Strengths: resourceful  Patient Vulnerabilities: substance abuse/addiction  Taken 11/19/2021 2210 by Marilia Perez RN  Anxieties, Fears or Concerns: none reported     Problem: Adult Behavioral Health Plan of Care  Goal: Optimized Coping Skills in Response to Life Stressors  Intervention: Promote Effective Coping Strategies  Flowsheets (Taken 11/22/2021 1034)  Supportive Measures:   active listening utilized   self-reflection promoted   self-responsibility promoted   positive reinforcement provided   counseling provided   decision-making supported   problem-solving facilitated   verbalization of feelings encouraged   goal setting facilitated   self-care encouraged     Problem: Adult Behavioral Health Plan of Care  Goal: Develops/Participates in Therapeutic [place] to Support Successful Transition  Intervention: Foster Therapeutic [place]  Flowsheets (Taken 11/22/2021 1034)  Trust Relationship/Rapport:   care explained   questions encouraged   choices provided   reassurance provided   emotional support provided   thoughts/feelings acknowledged   empathic listening provided   questions answered     Problem: Adult Behavioral Health Plan of Care  Goal: Develops/Participates in Therapeutic [place] to Support Successful Transition  Intervention: Mutually Develop Transition Plan  Flowsheets  Taken 11/22/2021 1034  Transition Support:   community  resources reviewed   crisis management plan promoted   follow-up care discussed  Taken 11/22/2021 1033  Discharge Coordination/Progress: Patient has no insurance, he signed for [facility] and [facility] today.  Taken 11/22/2021 1029  Transportation Anticipated: agency  Transportation Concerns: car, none  Current Discharge Risk:   substance use/abuse   psychiatric illness  Concerns to be Addressed:   coping/stress   substance/tobacco abuse/use   mental health  Readmission Within the Last 30 Days: no previous admission in last 30 days  Patient/Family Anticipated Services at Transition: rehabilitation services  Patient's Choice of Community Agency(s): [facility]-consent obtained , [facility] consent obtained  Patient/Family Anticipates Transition to: inpatient rehabilitation facility  Offered/Gave Vendor List: no  Data:  Therapist reviewed Dr. Jean's assessment, discussed patient with nursing staff and met with patient this date to further discuss patient progress, review healthy coping and safe disposition.      Clinical Maneuvering/Intervention:    Therapist assisted patient in processing above session content; acknowledged and normalized patient's thoughts, feelings and concerns.  Encouraged patient to discuss/vent feelings, frustrations, and fears concerning their ongoing issues and validated patients feelings.  Discussed the importance of healthy coping and reviewed healthy coping skills such as distraction, thought reframing/redirecting, grounding, mindfulness, etc.  Reviewed safe disposition with patient.    Assessment:  Patient denies suicidal ideation/homicidal ideation.  Patient reports ongoing depression and anxiety today.  Patient states he continues to have withdrawal symptoms.  He reported that he is going to talk with the lady from [facility] and his family.  He reports that he really needs to get back to work.  He reports that he has completed long term treatment in the  past and does not feel he necessarily needs to attend but he is unsure what he will do for sure until after talking with some people.  He consented for both [facility] in case he decides to attend and also [facility]-in case he decides not to attend.  Patient resides in [facility].  He discussed his struggles with anxiety and hearing voices.  Therapist discussed some healthy coping skills with patient including redirecting negative thoughts and deep breathing exercises.    Plan:  Patient will continue hospitalization/medication management. Patient has consented for [facility] and [facility]

## 2021-11-22 NOTE — PROGRESS NOTES
Navigator is helping Primary Therapist with discharge planning for patient. Navigator contacted [facility] and spoke with [facility]. Patient can admit to their facility when ready for discharge. [facility] asks that we call the day before discharge if possible so they can arrange transportation.     [facility] - [phone]

## 2021-11-22 NOTE — DISCHARGE INSTR - APPOINTMENTS
[facility]   [id number], [place] [address]  [phone]    Patient to admit at discharge.             [address] [address]  [phone]    11/30/2021 at 2:15pm for Intake  11/30/2021 at 3:00pm with Mylene

## 2021-11-22 NOTE — PLAN OF CARE
Problem: Adult Behavioral Health Plan of Care  Goal: Plan of Care Review  Outcome: Ongoing, Progressing  Flowsheets  Taken 11/22/2021 1812  Plan of Care Reviewed With: patient  Patient Agreement with Plan of Care: agrees  Outcome Summary: client calm and cooperative, he complained of anxiety, depression,craving and intermittent sleep  Taken 11/22/2021 0807  Plan of Care Reviewed With: patient  Patient Agreement with Plan of Care: agrees   Goal Outcome Evaluation:  Plan of Care Reviewed With: patient  Patient Agreement with Plan of Care: agrees        Outcome Summary: client calm and cooperative, he complained of anxiety, depression,craving and intermittent sleep

## 2021-11-22 NOTE — PLAN OF CARE
Problem: Adult Behavioral Health Plan of Care  Goal: Plan of Care Review  Outcome: Ongoing, Progressing  Goal: Patient-Specific Goal (Individualization)  Outcome: Ongoing, Progressing  Goal: Adheres to Safety Considerations for Self and Others  Outcome: Ongoing, Progressing  Intervention: Develop and Maintain Individualized Safety Plan  Description: Identify risk factors for violence to self and others at time of admission, times of risk elevation, and on discharge.  Obtain clinical history including suicidal, homicidal, combative, assaultive, or aggressive behavior.  Discuss safety concerns with patient; develop a corresponding safety plan.  Provide immediate and ongoing protective physical environment.  Conduct environment of care safety checks; monitor visitors and their possessions.  Be alert to warning signs of suicidal, homicidal, assaultive, or aggressive behavior  Note: Denial of suicidal ideation or agreement to a safety plan does not invalidate suicide risk.  Encourage frequent positive patient-staff interactions to promote verbalization of safety compromising ideations, thoughts or behaviors.  Goal: Absence of New-Onset Illness or Injury  Outcome: Ongoing, Progressing  Intervention: Identify and Manage Fall Risk  Description: Perform standard risk assessment on admission and reassess fall risk frequently, with change in status or transfer to another level of care.  Communicate fall injury risk to interprofessional healthcare team.  Determine need for increased observation, equipment and environmental modification.  Adjust safety measures to individual developmental age and stage and identified risk factors.  Reinforce the importance of safety and activity limitations to patient and family.  Perform regular intentional rounding to assess safety needs.  Goal: Optimized Coping Skills in Response to Life Stressors  Outcome: Ongoing, Progressing  Intervention: Promote Effective Coping  Strategies  Description: Identify current effective and ineffective coping strategies and strengths.  Assist with developing and use of positive coping strategies.  Utilize positive psychology techniques to enhance well-being.  Promote wellness through healthy lifestyle activities.  Consider complementary or alternative approaches.  Provide psychoeducation.  Goal: Develops/Participates in Therapeutic [place] to Support Successful Transition  Outcome: Ongoing, Progressing  Intervention: Foster Therapeutic [place]  Description: Establish rapport; develop trust relationship.  Provide a safe space for interaction; convey accept and and respect.  Normalize and validate patient experience; provide emotional support.  Identify and develop realistic, achievable recovery goals via shared decision-making.  Provide person and family-centered care; incorporate family/significant others in assessment and treatment planning.  Honor confidentiality.     Problem: Activity and Energy Impairment (Excessive Substance Use)  Goal: Optimized Energy Level (Excessive Substance Use)  Outcome: Ongoing, Progressing  Intervention: Optimize Energy Level  Description: Encourage activities to promote self-care.  Provide structured exercise options.  Encourage participation in expressive and recreational services.  Utilize mindfulness, stress management and applied relaxation techniques.     Problem: Behavior Regulation Impairment (Excessive Substance Use)  Goal: Improved Behavioral Control (Excessive Substance Use)  Outcome: Ongoing, Progressing  Intervention: Promote Behavior and Impulse Control  Description: Assess degree of safety awareness impairment; mutually identify self-destructive behaviors.  Express empathy and acceptance; identify consumption pattern, concerning behaviors, as well as internal and external triggers.  Identify alternative response and self-control strategies to minimize maladaptive behavior and safety risk.  Assess  level of insight and readiness to change; explore and acknowledge ambivalence.  Discuss behavior impact and potential consequences; elicit recognition of gap between current behavior and desired life goals.  Explore motivations for change, affirming change-related statements and efforts.  Explore and resolve ambivalence associated with commitment to behavior change.  Utilize positive reinforcement and encouragement to promote productive behavior and affirm progress.  Discuss strategies for reducing or stopping use, relapse prevention and managing high-risk situations.  Encourage cognitive-behavioral therapy and motivational enhancement therapy to address maladaptive thoughts and behavioral patterns.     Problem: Decreased Participation and Engagement (Excessive Substance Use)  Goal: Increased Participation and Engagement (Excessive Substance Use)  Outcome: Ongoing, Progressing  Intervention: Facilitate Participation and Engagement  Description: Provide opportunity for expression of feelings, stressors and self-perception.  Identify and address social and environmental stressors; problem-solve utilizing strengths and resources.  Explore values and expectations to enhance life satisfaction.  Identify activities that were previously gratifying; encourage gradual re-participation and schedule daily pleasurable activities.  Mutually establish recovery goals and vision for the future.  Explore leisure interests and promote development of positive hobbies; provide opportunities for expression.  Encourage program participation and adherence to scheduled activities.  Introduce concepts of the recovery model.     Problem: Physiologic Impairment (Excessive Substance Use)  Goal: Improved Physiologic Symptoms (Excessive Substance Use)  Outcome: Ongoing, Progressing     Problem: Social, Occupational or Functional Impairment (Excessive Substance Use)  Goal: Enhanced Social, Occupational or Functional Skills (Excessive Substance  Use)  Outcome: Ongoing, Progressing  Intervention: Promote Social, Occupational and Functional Ability  Description: Complete a functional assessment; identify deficit areas.  Explore the effect of symptoms or behavior on academic and occupational functioning; evaluate quality-of-life.  Provide family-based services, such as education, emotional support or family coping.  Assess quality of relationships and work interactions; support social engagement and model appropriate social skills.  Provide frequent opportunities to increase, resume and repair interactions with others.  Encourage participation in social-skills training to improve day-to-day living, vocational or recreational skills.  Promote active involvement in social support-based group mutual help program.     Problem: Alcohol Withdrawal  Goal: Alcohol Withdrawal Symptom Control  Outcome: Ongoing, Progressing  Intervention: Manage Addictive Behavior (Brief Intervention)  Description: Assess pattern of substance use from multiple information sources.  Provide education about risks of current use; develop a mutual treatment plan.  Assess readiness to change; identify stage of change and current level of insight.  Explore motivations for change, affirming change-related statements; identify and develop recovery goals.  Elicit recognition of gap between current behavior and desired life goals.  Explore ambivalence associated with commitment to behavior change; support steps toward change, however small.  Identify internal and external triggers and methods to cope.  Assist with transition to next level of care; establish or re-establish connections with community resources; identify and address safety risk.  Identify and address factors that impact overall treatment adherence     Problem: Activity and Energy Impairment (Depressive Signs/Symptoms)  Goal: Optimized Energy Level (Depressive Signs/Symptoms)  Outcome: Ongoing, Progressing  Intervention: Optimize  Energy Level  Description: Encourage activities to promote self-care.  Provide structured exercise options.  Encourage participation in behavioral-activation therapy  Encourage participation in expressive and recreational services.     Problem: Cognitive Impairment (Depressive Signs/Symptoms)  Goal: Optimized Cognitive Function (Depressive Signs/Symptoms)  Outcome: Ongoing, Progressing     Problem: Decreased Participation and Engagement (Depressive Signs/Symptoms)  Goal: Increased Participation and Engagement (Depressive Signs/Symptoms)  Outcome: Ongoing, Progressing  Intervention: Facilitate Participation and Engagement  Description: Provide opportunity for expression of feelings, stressors and self-perception.  Identify and address social and environmental stressors.  Explore values and expectations to increase life satisfaction.  Identify activities that were previously gratifying; encourage gradual re-participation and schedule daily pleasurable activities.  Mutually establish recovery goals and vision for the future.  Explore leisure interests and promote development of positive hobbies; provide opportunities for expression.  Encourage program participation and adherence to scheduled activities.     Problem: Feelings of Worthlessness, Hopelessness or Excessive Guilt (Depressive Signs/Symptoms)  Goal: Enhanced Self-Esteem and Confidence (Depressive Signs/Symptoms)  Outcome: Ongoing, Progressing     Problem: Mood Impairment (Depressive Signs/Symptoms)  Goal: Improved Mood Symptoms (Depressive Signs/Symptoms)  Outcome: Ongoing, Progressing  Intervention: Promote Mood Improvement  Description: Assess subjective and objective presentation of mood and emotional state.  Encourage and promote emotional awareness, acceptance and expression of feelings.  Provide psychoeducation and supportive therapy interventions to improve mood and emotions.  Encourage participation in therapeutic leisure and recreational activity to  assist mood and emotion regulation.  Utilize positive communication to encourage, affirm and acknowledge progress, as well as convey hope.  Utilize cognitive-behavioral techniques to address distorted, maladaptive, inaccurate thoughts and images.     Problem: Nutrition Imbalance (Depressive Signs/Symptoms)  Goal: Optimized Nutrition Intake (Depressive Signs/Symptoms)  Outcome: Ongoing, Progressing  Intervention: Promote Optimal Nutrition Intake  Description: Perform a nutrition assessment that includes a nutrition-focused physical exam.  Evaluate need for diet modification, limitations or supplementation.  Monitor food and beverage selections; assess intake, eating patterns and behavior.  Weigh patient and monitor trends.  Encourage healthy food choices, such as fruits, vegetables, whole grains, low-fat dairy products and lean meats.  Consider cultural and Oriental orthodox preferences when providing food and beverage choices.     Problem: Psychomotor Impairment (Depressive Signs/Symptoms)  Goal: Improved Psychomotor Symptoms (Depressive Signs/Symptoms)  Outcome: Ongoing, Progressing  Intervention: Manage Psychomotor Movement  Description: Assess and monitor for agitation or retardation.  Support neurostimulation therapy; provide postprocedure care.  Support medication adherence; monitor response and side effects.  Monitor laboratory values for therapeutic levels.     Problem: Sleep Disturbance (Depressive Signs/Symptoms)  Goal: Improved Sleep (Depressive Signs/Symptoms)  Outcome: Ongoing, Progressing  Intervention: Promote Healthy Sleep Hygiene  Description: Assess for psychologic and environmental factors contributing to hypersomnia or insomnia.  Maintain conducive environment for sleep.  Encourage sleep diary maintenance.  Encourage consistent sleep-wake schedule and healthy sleep routine.  Discourage remaining in bed, daytime napping and isolation; if napping is necessary, limit time.  Decrease physical and  environmental stimulation, including activities that increase body temperature before bedtime.  Consider limiting fluid consumption in the evening.  Encourage nonstimulating activities, if nighttime wakening occurs.  Consider cognitive-behavioral treatment of insomnia.     Problem: Social, Occupational or Functional Impairment (Depressive Signs/Symptoms)  Goal: Enhanced Social, Occupational or Functional Skills (Depressive Signs/Symptoms)  Outcome: Ongoing, Progressing  Intervention: Promote Social, Occupational and Functional Ability  Description: Complete a functional assessment; identify deficit areas.  Explore the effect of symptoms or behavior on academic and occupational functioning; evaluate quality of life.  Assess quality of relationships and work interactions; support social engagement; model appropriate social skills.  Provide frequent opportunities to increase, resume and repair relationships with others.  Encourage participation in social-skills training to improve day-to-day living, vocational or recreational skills.  Provide family-based services, such as education, emotional support or family coping.  Assess psychosocial needs; problem-solve utilizing strengths and resources.     Problem: Electrolyte Imbalance  Goal: Electrolyte Balance  Outcome: Ongoing, Progressing   Goal Outcome Evaluation:  Plan of Care Reviewed With: patient  Patient Agreement with Plan of Care: agrees     Progress: improving  Outcome Summary: quiet and withdrawn. Improved appetite. Experiencing auditory hallucinations. BP elevated.

## 2021-11-22 NOTE — PROGRESS NOTES
"    Detox Recovery Unit Progress Note   Clinician: David Jean MD  Admission Date: 11/19/2021  09:17 EST 11/22/21    Behavioral Health Treatment Plan and Problem List: I have reviewed and approved the Behavioral Health Treatment Plan and Problem list.    Allergies  No Known Allergies    Hospital Day: 3 days      Assessment completed within view of staff    History  CC: withdrawal symptoms    Interval HPI: Patient seen and evaluated by me.  Chart reviewed. Patient is withdrawing from alcohol, BZD, opiates, methamphetamine  Current withdrawal symptoms include: fatigue, malaise, nausea,   Dry heaving, leg cramps.   C/o trazodone causing symptoms of restless legs.   ROS otherwise as below.        Interval Review of Systems:   General ROS: negative for - fever.  Positive for withdrawal symptoms mentioned above.  Endocrine ROS: negative for - palpitations  Respiratory ROS: no cough, shortness of breath, or wheezing  Cardiovascular ROS: no chest pain or dyspnea on exertion  Gastrointestinal ROS: no abdominal pain,no black or bloody stools    BP (!) 161/101 (BP Location: Right arm, Patient Position: Sitting)   Pulse 83   Temp 97.8 °F (36.6 °C) (Temporal)   Resp 16   Ht 180.3 cm (71\")   Wt 78.8 kg (173 lb 12.8 oz)   SpO2 98%   BMI 24.24 kg/m²     Mental Status Exam  Mood: dysphoric  Affect: mood congruent  Thought Processes: linear  Thought Content: No Delusions voiced  Hallucinations: Denies  Suicidal Thoughts: Denies  Suicidal Plan/Intent: Denies  Hopelesness: Moderate          Medical Decision Making:   Labs:     Lab Results (last 24 hours)     ** No results found for the last 24 hours. **            Radiology:     Imaging Results (Last 24 Hours)     ** No results found for the last 24 hours. **            EKG:     ECG/EMG Results (most recent)     Procedure Component Value Units Date/Time    ECG 12 Lead [[id number]] Collected: 11/19/21 2247     Updated: 11/19/21 2248     QT Interval 430 ms      QTC Interval 432 " ms     Narrative:      Test Reason : Baseline Cardiac Status  Blood Pressure :   */*   mmHG  Vent. Rate :  61 BPM     Atrial Rate :  61 BPM     P-R Int : 144 ms          QRS Dur : 104 ms      QT Int : 430 ms       P-R-T Axes :  53  31  53 degrees     QTc Int : 432 ms    Sinus rhythm with premature atrial complexes  Incomplete right bundle branch block  Borderline ECG  No previous ECGs available    Referred By:            Confirmed By:            Medications:  amoxicillin-clavulanate, 1 tablet, Oral, Q12H  LORazepam, 1 mg, Oral, 3 times per day   Followed by  [START ON 11/23/2021] LORazepam, 0.5 mg, Oral, 3 times per day  nicotine, 1 patch, Transdermal, Q24H  vitamin B-1, 100 mg, Oral, Daily           All medications reviewed.      Assessment and Plan:  Alcohol use disorder, severe, dependence  -Admitted for crisis stabilization and voluntary detox  -UDS negative for benzodiazepines but patient reports he last drank 15 hours prior to admission  -Ativan detox protocol  -Supplement with thiamine and multivitamin  -Encourage cessation  -Encourage substance abuse treatment at discharge     Hypertension  - His blood pressure has been running high since admission.  +family h/o HTN.  He has previously been told by a PCP that he needs to take medication for HTN.  - Start trial of metoprolol XL 25mg daily.    I have discussed with the patient the risks, benefits, side effects, and treatment alternatives to this medication. Patient has also been instructed regarding the risks versus benefits of no treatment and also the fact that treatment does not guarantee results.  Patient voices an understanding of this and accepts the risks associated with the use of this medication.   Patient is instructed to review the medication information packets provided by the pharmacy and to call me with any questions or concerns related to the medication.  Patient agrees to notify all of their prescribers of the recent medication change, and to  notify me immediately if prescribed any other medication by another provider, so as to allow me to verify that the medications I am prescribing do not interfere with the newly prescribed medication.    Insomnia  - Stop trazodone and start trial of doxepin 25mg at bedtime    Elevated LFT's  - LFTS elevated on admission  - Will recheck CMP in AM    Benzodiazepine use disorder, severe, dependence   -UDS negative for benzodiazepines but patient reports he last drank 15 hours prior to admission  -Ativan detox protocol  -Supplement with thiamine and multivitamin  -Encourage cessation  -Encourage substance abuse treatment at discharge     Opioid use disorder, severe, dependence  -UDS negative, no major withdrawal symptoms noted  -Monitor for withdrawal  -Encourage cessation  -Encourage substance abuse treatment at discharge     Methamphetamine use disorder, severe, dependence  -Supportive care  -Encourage cessation  -Encourage substance abuse treatment at discharge     Cannabis use disorder, severe, dependence  -Supportive care  -Encourage cessation  -Encourage substance abuse treatment at discharge     Nicotine use disorder  -Encourage cessation     Oral infection with severely poor dentition  -Patient endorses pain and pressure on his face around his gumline where he has severely deteriorated teeth  -Started Augmentin 875 mg twice daily for 10 days on 11/20/21  -Encouraged follow-up with dentistry      Continue hospitalization for medical management of drug withdrawal and patient safety and stabilization.  Continue individual and group chemical dependency counseling.   Therapist and treatment team will continue to assist patient in establishing plans for follow-up and rehabilitation that will serve as the next step in care once patient has completed the medical detox.

## 2021-11-23 VITALS
HEART RATE: 60 BPM | RESPIRATION RATE: 16 BRPM | TEMPERATURE: 97 F | HEIGHT: 71 IN | DIASTOLIC BLOOD PRESSURE: 87 MMHG | SYSTOLIC BLOOD PRESSURE: 140 MMHG | BODY MASS INDEX: 24.33 KG/M2 | WEIGHT: 173.8 LBS | OXYGEN SATURATION: 98 %

## 2021-11-23 LAB
ALBUMIN SERPL-MCNC: 3.43 G/DL (ref 3.5–5.2)
ALBUMIN/GLOB SERPL: 1.1 G/DL
ALP SERPL-CCNC: 111 U/L (ref 39–117)
ALT SERPL W P-5'-P-CCNC: 120 U/L (ref 1–41)
ANION GAP SERPL CALCULATED.3IONS-SCNC: 7.8 MMOL/L (ref 5–15)
AST SERPL-CCNC: 111 U/L (ref 1–40)
BILIRUB SERPL-MCNC: 0.5 MG/DL (ref 0–1.2)
BUN SERPL-MCNC: 16 MG/DL (ref 6–20)
BUN/CREAT SERPL: 18.2 (ref 7–25)
CALCIUM SPEC-SCNC: 8.5 MG/DL (ref 8.6–10.5)
CHLORIDE SERPL-SCNC: 104 MMOL/L (ref 98–107)
CO2 SERPL-SCNC: 25.2 MMOL/L (ref 22–29)
CREAT SERPL-MCNC: 0.88 MG/DL (ref 0.76–1.27)
GFR SERPL CREATININE-BSD FRML MDRD: 94 ML/MIN/1.73
GLOBULIN UR ELPH-MCNC: 3.2 GM/DL
GLUCOSE SERPL-MCNC: 106 MG/DL (ref 65–99)
POTASSIUM SERPL-SCNC: 4 MMOL/L (ref 3.5–5.2)
PROT SERPL-MCNC: 6.6 G/DL (ref 6–8.5)
SODIUM SERPL-SCNC: 137 MMOL/L (ref 136–145)

## 2021-11-23 PROCEDURE — 80053 COMPREHEN METABOLIC PANEL: CPT | Performed by: PSYCHIATRY & NEUROLOGY

## 2021-11-23 PROCEDURE — 99238 HOSP IP/OBS DSCHRG MGMT 30/<: CPT | Performed by: PSYCHIATRY & NEUROLOGY

## 2021-11-23 RX ORDER — METOPROLOL SUCCINATE 25 MG/1
25 TABLET, EXTENDED RELEASE ORAL
Qty: 30 TABLET | Refills: 0 | Status: SHIPPED | OUTPATIENT
Start: 2021-11-24

## 2021-11-23 RX ORDER — AMOXICILLIN AND CLAVULANATE POTASSIUM 875; 125 MG/1; MG/1
1 TABLET, FILM COATED ORAL EVERY 12 HOURS SCHEDULED
Qty: 14 TABLET | Refills: 0 | Status: SHIPPED | OUTPATIENT
Start: 2021-11-23 | End: 2021-11-30

## 2021-11-23 RX ADMIN — IBUPROFEN 400 MG: 400 TABLET, FILM COATED ORAL at 08:09

## 2021-11-23 RX ADMIN — METOPROLOL SUCCINATE 25 MG: 25 TABLET, EXTENDED RELEASE ORAL at 08:06

## 2021-11-23 RX ADMIN — Medication 100 MG: at 08:06

## 2021-11-23 RX ADMIN — LORAZEPAM 0.5 MG: 0.5 TABLET ORAL at 08:06

## 2021-11-23 RX ADMIN — AMOXICILLIN AND CLAVULANATE POTASSIUM 1 TABLET: 875; 125 TABLET, FILM COATED ORAL at 08:06

## 2021-11-23 NOTE — PLAN OF CARE
Goal Outcome Evaluation:        Consent Given to Review Plan with: pt reports anxiety depression and craving 10 elevated bp. pt wanting to leave and go home.  Progress: no change  Outcome Summary: New admit this shift. Pt had elevated bp

## 2021-11-23 NOTE — DISCHARGE SUMMARY
Date of Discharge:  11/23/2021    Discharge Diagnosis:Active Problems:    Chemical dependency (HCC)      Presenting Problem/History of Present Illness:  46 y.o. male who was admitted on 11/19/2021 with complaints of drug use and withdrawals. The patient reports a long history of substance use. First use was 14 years old. Over time the use increased and the patient  continued to use despite negative consequences. The patient endorses symptoms of tolerance and withdrawals. Has tried to cut down and stop but has not been successful. Spends too much time and resources in pursuit of substance use. Longest period of sobriety is reported to be 6 months.  Currently using opiates, xanax, meth, marijuana, fifth of whiskey  Last use 11-  Withdrawal symptoms nausea, diarrhea, anxious, antzy, body aches    Hospital Course:  Patient was admitted for detox and stabilization.   Routine labs were checked.  Patient was assigned a masters level therapist and provided with an opportunity to participate in group and individual chemical dependency counseling on the unit.  Patient was started on the appropriate detox protocol - ativan.   He was provided with thiamine and multivitamin supplementation. His blood pressure was known to be running high during the mission and he reported a family history of hypertension.   He was started on Metropole all XL 25 mg daily on November 23. Was also started on trial of doxepin 25 mg at bedtime for insomnia. Liver enzymes were noted to be elevated on admission and repeat CMP on 1123 showed persistent elevation of liver enzymes. He was started on Augmentin on November 20 for possible dental infection. He was encouraged to follow up with dentistry.   On hospital day number four patient requested to leave against medical advice. He did not meet criteria for a hold against his will. He was allowed to leave AMA.    Consults:   Consults     No orders found from 10/21/2021 to 11/20/2021.           Labs:  Lab Results (all)     Procedure Component Value Units Date/Time    Comprehensive Metabolic Panel [[id number]]  (Abnormal) Collected: 11/23/21 0530    Specimen: Blood Updated: 11/23/21 0620     Glucose 106 mg/dL      BUN 16 mg/dL      Creatinine 0.88 mg/dL      Sodium 137 mmol/L      Potassium 4.0 mmol/L      Chloride 104 mmol/L      CO2 25.2 mmol/L      Calcium 8.5 mg/dL      Total Protein 6.6 g/dL      Albumin 3.43 g/dL      ALT (SGPT) 120 U/L      AST (SGOT) 111 U/L      Alkaline Phosphatase 111 U/L      Total Bilirubin 0.5 mg/dL      eGFR Non African Amer 94 mL/min/1.73      Globulin 3.2 gm/dL      A/G Ratio 1.1 g/dL      BUN/Creatinine Ratio 18.2     Anion Gap 7.8 mmol/L     Narrative:      GFR Normal >60  Chronic Kidney Disease <60  Kidney Failure <15            Imaging:  Imaging Results (All)     None                  Condition on Discharge:  improved        Vital Signs  Temp:  [97 °F (36.1 °C)-99.5 °F (37.5 °C)] 97 °F (36.1 °C)  Heart Rate:  [60-98] 60  Resp:  [16] 16  BP: (113-151)/() 140/87    Discharge Disposition  Left Against Medical Advice    Discharge Medications     Discharge Medications      New Medications      Instructions Start Date   metoprolol succinate XL 25 MG 24 hr tablet  Commonly known as: TOPROL-XL   25 mg, Oral, Every 24 Hours Scheduled   Start Date: November 24, 2021            Discharge Diet: Regular    Activity at Discharge: As tolerated    Follow-up Appointments:    No future appointments.        Time: I spent  15 minutes on this discharge activity which included: face-to-face encounter with the patient, reviewing the data in the system, coordination of the care with the nursing staff as well as consultants, documentation, and entering orders.      David Jean MD  11/23/21  12:43 EST

## 2021-11-23 NOTE — PROGRESS NOTES
Patient left before we were able to get prescription of augmentin to him.  He was advised to followup with a dentist however.  Nursing is going to try to get in touch with patient to try to see if we can call in the prescription to a pharmacy of his choice.

## 2021-11-23 NOTE — NURSING NOTE
Attempted to call client in regards to information on a pharmacy he uses for dr. SOLOMON Jean. No answer and no voicemail set up.

## 2021-11-23 NOTE — NURSING NOTE
Client was seen by dr raven child via telehealth in office with staff present. Client insisted on being discharged stating he had things he had to do before additional treatment for recovery. A.M.A. discharge ordered. Client encouraged to stay but refused. Teaching done in regards to the risks of noncompliance of his treatment plan and the benefits of completing his treatment plan.

## 2021-11-23 NOTE — PLAN OF CARE
Problem: Adult Behavioral Health Plan of Care  Goal: Plan of Care Review  Outcome: Adequate for Care Transition  Goal: Patient-Specific Goal (Individualization)  Outcome: Adequate for Care Transition  Goal: Adheres to Safety Considerations for Self and Others  Outcome: Adequate for Care Transition  Goal: Absence of New-Onset Illness or Injury  Outcome: Adequate for Care Transition  Goal: Optimized Coping Skills in Response to Life Stressors  Outcome: Adequate for Care Transition  Goal: Develops/Participates in Therapeutic [place] to Support Successful Transition  Outcome: Adequate for Care Transition   Goal Outcome Evaluation:  Plan of Care Reviewed With: patient  Patient Agreement with Plan of Care: agrees        Outcome Summary: client calm and cooperative, he complained of anxiety, depression,craving and intermittent sleep

## 2021-11-23 NOTE — PROGRESS NOTES
"    Detox Recovery Unit Progress Note   Clinician: David Jean MD  Admission Date: 11/19/2021  10:00 EST 11/23/21    Behavioral Health Treatment Plan and Problem List: I have reviewed and approved the Behavioral Health Treatment Plan and Problem list.    Allergies  No Known Allergies    Hospital Day: 4 days      Assessment completed within view of staff    History  CC: withdrawal symptoms    Interval HPI: Patient seen and evaluated by me.  Chart reviewed. Patient is withdrawing from alcohol, BZD, opiates, methamphetamine  Current withdrawal symptoms include:  anxiety   ROS otherwise as below.        Interval Review of Systems:   General ROS: negative for - fever.  Positive for withdrawal symptoms mentioned above.  Endocrine ROS: negative for - palpitations  Respiratory ROS: no cough, shortness of breath, or wheezing  Cardiovascular ROS: no chest pain or dyspnea on exertion  Gastrointestinal ROS: no abdominal pain,no black or bloody stools    /87 (BP Location: Right arm, Patient Position: Sitting)   Pulse 60   Temp 97 °F (36.1 °C) (Temporal)   Resp 16   Ht 180.3 cm (71\")   Wt 78.8 kg (173 lb 12.8 oz)   SpO2 98%   BMI 24.24 kg/m²     Mental Status Exam  Mental Status Exam  Mood: anxiety  Affect: mood congruent  Thought Processes: linear  Thought Content: No Delusions voiced  Hallucinations: Denies  Suicidal Thoughts: Denies  Suicidal Plan/Intent: Denies      Medical Decision Making:   Labs:     Lab Results (last 24 hours)     Procedure Component Value Units Date/Time    Comprehensive Metabolic Panel [[id number]]  (Abnormal) Collected: 11/23/21 0530    Specimen: Blood Updated: 11/23/21 0620     Glucose 106 mg/dL      BUN 16 mg/dL      Creatinine 0.88 mg/dL      Sodium 137 mmol/L      Potassium 4.0 mmol/L      Chloride 104 mmol/L      CO2 25.2 mmol/L      Calcium 8.5 mg/dL      Total Protein 6.6 g/dL      Albumin 3.43 g/dL      ALT (SGPT) 120 U/L      AST (SGOT) 111 U/L      Alkaline Phosphatase 111 U/L  "     Total Bilirubin 0.5 mg/dL      eGFR Non African Amer 94 mL/min/1.73      Globulin 3.2 gm/dL      A/G Ratio 1.1 g/dL      BUN/Creatinine Ratio 18.2     Anion Gap 7.8 mmol/L     Narrative:      GFR Normal >60  Chronic Kidney Disease <60  Kidney Failure <15              Radiology:     Imaging Results (Last 24 Hours)     ** No results found for the last 24 hours. **            EKG:     ECG/EMG Results (most recent)     Procedure Component Value Units Date/Time    ECG 12 Lead [[id number]] Collected: 11/19/21 2247     Updated: 11/22/21 1004     QT Interval 430 ms      QTC Interval 432 ms     Narrative:      Test Reason : Baseline Cardiac Status  Blood Pressure :   */*   mmHG  Vent. Rate :  61 BPM     Atrial Rate :  61 BPM     P-R Int : 144 ms          QRS Dur : 104 ms      QT Int : 430 ms       P-R-T Axes :  53  31  53 degrees     QTc Int : 432 ms    Sinus rhythm with premature atrial complexes  Incomplete right bundle branch block  Borderline ECG  No previous ECGs available  Confirmed by Katherine Vasquez ([id number]) on 11/22/2021 10:04:27 AM    Referred By:            Confirmed By: Katherine Vasquez           Medications:  amoxicillin-clavulanate, 1 tablet, Oral, Q12H  LORazepam, 0.5 mg, Oral, 3 times per day  metoprolol succinate XL, 25 mg, Oral, Q24H  nicotine, 1 patch, Transdermal, Q24H  vitamin B-1, 100 mg, Oral, Daily           All medications reviewed.      Assessment and Plan:  Alcohol use disorder, severe, dependence  -Admitted for crisis stabilization and voluntary detox  -UDS negative for benzodiazepines but patient reports he last drank 15 hours prior to admission  -Ativan detox protocol  -Supplement with thiamine and multivitamin  -Encourage cessation  -Encourage substance abuse treatment at discharge     Hypertension  - His blood pressure has been running high since admission.  +family h/o HTN.  He has previously been told by a PCP that he needs to take medication for HTN.  - Started trial of metoprolol XL 25mg  daily on 11/23     Insomnia  - Started trial of doxepin 25mg at bedtime on 11/23     Elevated LFT's  - LFTS elevated on admission  - Repeat /111 today     Benzodiazepine use disorder, severe, dependence   -UDS negative for benzodiazepines but patient reports he last drank 15 hours prior to admission  -Ativan detox protocol  -Supplement with thiamine and multivitamin  -Encourage cessation  -Encourage substance abuse treatment at discharge     Opioid use disorder, severe, dependence  -UDS negative, no major withdrawal symptoms noted  -Monitor for withdrawal  -Encourage cessation  -Encourage substance abuse treatment at discharge     Methamphetamine use disorder, severe, dependence  -Supportive care  -Encourage cessation  -Encourage substance abuse treatment at discharge     Cannabis use disorder, severe, dependence  -Supportive care  -Encourage cessation  -Encourage substance abuse treatment at discharge     Nicotine use disorder  -Encourage cessation     Oral infection with severely poor dentition  -Patient endorses pain and pressure on his face around his gumline where he has severely deteriorated teeth  -Started Augmentin 875 mg twice daily for 10 days on 11/20/21  -Encouraged follow-up with dentistry         Continue hospitalization for medical management of drug withdrawal and patient safety and stabilization.  Continue individual and group chemical dependency counseling.   Therapist and treatment team will continue to assist patient in establishing plans for follow-up and rehabilitation that will serve as the next step in care once patient has completed the medical detox.